# Patient Record
Sex: MALE | Race: WHITE
[De-identification: names, ages, dates, MRNs, and addresses within clinical notes are randomized per-mention and may not be internally consistent; named-entity substitution may affect disease eponyms.]

---

## 2019-02-12 ENCOUNTER — HOSPITAL ENCOUNTER (INPATIENT)
Dept: HOSPITAL 31 - C.ER | Age: 59
LOS: 2 days | Discharge: HOME | DRG: 225 | End: 2019-02-14
Attending: FAMILY MEDICINE | Admitting: FAMILY MEDICINE
Payer: COMMERCIAL

## 2019-02-12 DIAGNOSIS — I10: ICD-10-CM

## 2019-02-12 DIAGNOSIS — K35.80: Primary | ICD-10-CM

## 2019-02-12 DIAGNOSIS — E11.9: ICD-10-CM

## 2019-02-12 DIAGNOSIS — K42.9: ICD-10-CM

## 2019-02-12 LAB
ALBUMIN SERPL-MCNC: 4.4 G/DL (ref 3.5–5)
ALBUMIN/GLOB SERPL: 1.4 {RATIO} (ref 1–2.1)
ALT SERPL-CCNC: 17 U/L (ref 21–72)
AST SERPL-CCNC: 25 U/L (ref 17–59)
BASOPHILS # BLD AUTO: 0.1 K/UL (ref 0–0.2)
BASOPHILS NFR BLD: 0.7 % (ref 0–2)
BILIRUB UR-MCNC: NEGATIVE MG/DL
BUN SERPL-MCNC: 22 MG/DL (ref 9–20)
CALCIUM SERPL-MCNC: 8.8 MG/DL (ref 8.6–10.4)
EOSINOPHIL # BLD AUTO: 0.2 K/UL (ref 0–0.7)
EOSINOPHIL NFR BLD: 2.3 % (ref 0–4)
ERYTHROCYTE [DISTWIDTH] IN BLOOD BY AUTOMATED COUNT: 13.5 % (ref 11.5–14.5)
GFR NON-AFRICAN AMERICAN: > 60
GLUCOSE UR STRIP-MCNC: (no result) MG/DL
HGB BLD-MCNC: 14.8 G/DL (ref 12–18)
LEUKOCYTE ESTERASE UR-ACNC: (no result) LEU/UL
LYMPHOCYTES # BLD AUTO: 2.7 K/UL (ref 1–4.3)
LYMPHOCYTES NFR BLD AUTO: 33.2 % (ref 20–40)
MCH RBC QN AUTO: 29.5 PG (ref 27–31)
MCHC RBC AUTO-ENTMCNC: 33.5 G/DL (ref 33–37)
MCV RBC AUTO: 88.1 FL (ref 80–94)
MONOCYTES # BLD: 0.8 K/UL (ref 0–0.8)
MONOCYTES NFR BLD: 9.7 % (ref 0–10)
NEUTROPHILS # BLD: 4.3 K/UL (ref 1.8–7)
NEUTROPHILS NFR BLD AUTO: 54.1 % (ref 50–75)
NRBC BLD AUTO-RTO: 0.1 % (ref 0–2)
PH UR STRIP: 6 [PH] (ref 5–8)
PLATELET # BLD: 231 K/UL (ref 130–400)
PMV BLD AUTO: 8.4 FL (ref 7.2–11.7)
PROT UR STRIP-MCNC: NEGATIVE MG/DL
RBC # BLD AUTO: 5.02 MIL/UL (ref 4.4–5.9)
RBC # UR STRIP: (no result) /UL
SP GR UR STRIP: 1.01 (ref 1–1.03)
UROBILINOGEN UR-MCNC: NORMAL MG/DL (ref 0.2–1)
WBC # BLD AUTO: 8 K/UL (ref 4.8–10.8)

## 2019-02-12 NOTE — CP.PCM.HP
<Anitha Burt - Last Filed: 19 00:30>





History of Present Illness





- History of Present Illness


History of Present Illness: 





PGY-1 Anitha Burt D.O. H&P for Dr. Knowles's service:





Patient is a 59 yo [place] male with a history of T2DM and HTN who presents 

with abdominal pain. Patient states the pain started 2-3 days ago. It came on 

gradually and was located in his mid right abdomen. The pain worsened and 

migrated more into his RLQ. He describes the pain as sharp. He rates the pain 

severity as 7/10. He did not do anything/take any medication to help relieve the

pain. He did not note anything that makes the pain worse. He denies fevers and 

chills. Denies N/V/C/D. Denies change in appetite.





PMH: T2DM, HTN


PSH: back surgery for herniated disc 30 years ago


Meds: Metformin 500 mg PO BID, Lisinopril 10 mg PO daily


All: NKA upon admission (reaction to Zosyn in ED)


FH: father ( 84)- cancer in the abdomen


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 

tobacco, illicit drug use


PMD: none (previously Kath Iqbal)








Present on Admission





- Present on Admission


Any Indicators Present on Admission: No


History of DVT/PE: No


History of Uncontrolled Diabetes: No


Urinary Catheter: No


Decubitus Ulcer Present: No


History Surgical Site Infection Following: None





Review of Systems





- Constitutional


Constitutional: absent: Chills, Fever, Weight Loss





- EENT


Eyes: absent: Change in Vision


Ears: absent: Decreased Hearing


Nose/Mouth/Throat: absent: Nasal Congestion





- Cardiovascular


Cardiovascular: absent: Chest Pain, Diaphoresis, Dyspnea, Palpitations, Syncope





- Respiratory


Respiratory: absent: Cough, Dyspnea





- Gastrointestinal


Gastrointestinal: As Per HPI, Abdominal Pain.  absent: Constipation, Diarrhea, 

Heartburn, Nausea, Vomiting





- Genitourinary


Genitourinary: Dysuria, Urinary Frequency.  absent: Hematuria





- Musculoskeletal


Musculoskeletal: absent: Numbness, Tingling





- Integumentary


Integumentary: Pruritus.  absent: Lesions





- Neurological


Neurological: absent: Dizziness, Focal Weakness, Headaches, Paresthesias





- Psychiatric


Psychiatric: absent: Anxiety, Depression





- Endocrine


Endocrine: Polyuria.  absent: Palpitations





- Hematologic/Lymphatic


Hematologic: absent: Easy Bleeding, Easy Bruising, Lymphadenopathy





Past Patient History





- Infectious Disease


Hx of Infectious Diseases: None





- Tetanus Immunizations


Tetanus Immunization: Unknown





- Past Medical History & Family History


Past Medical History?: Yes


Past Family History: Reviewed and not pertinent





- Past Social History


Smoking Status: Never Smoked


Chewing Tobacco Use: No


Cigar Use: No


Alcohol: None


Drugs: Denies


Home Situation {Lives}: With Family





- CARDIAC


Hx Hypertension: Yes





- ENDOCRINE/METABOLIC


Hx Diabetes Mellitus Type 2: Yes





- PSYCHIATRIC


Hx Substance Use: No





Meds


Allergies/Adverse Reactions: 


                                    Allergies











Allergy/AdvReac Type Severity Reaction Status Date / Time


 


piperacillin [From Zosyn] Allergy  RASH Verified 19 00:18


 


tazobactam [From Zosyn] Allergy  RASH Verified 19 00:18














Physical Exam





- Constitutional


Appears: Non-toxic, No Acute Distress





- Head Exam


Head Exam: ATRAUMATIC, NORMAL INSPECTION





- Eye Exam


Eye Exam: EOMI, Normal appearance, PERRL





- ENT Exam


ENT Exam: Mucous Membranes Moist





- Neck Exam


Neck exam: Positive for: Normal Inspection





- Respiratory Exam


Respiratory Exam: Clear to Auscultation Bilateral, NORMAL BREATHING PATTERN





- Cardiovascular Exam


Cardiovascular Exam: RRR, +S1, +S2





- GI/Abdominal Exam


GI & Abdominal Exam: Guarding, Normal Bowel Sounds, Soft, Tenderness (RLQ).  

absent: Distended, Rebound, Rigid


Additional comments: 





negative Almeida's sign


negative Psoas sign








- Extremities Exam


Extremities exam: Positive for: normal inspection, pedal pulses present.  

Negative for: pedal edema





- Back Exam


Back exam: NORMAL INSPECTION





- Neurological Exam


Neurological exam: Alert, CN II-XII Intact, Oriented x3





- Psychiatric Exam


Psychiatric exam: Normal Affect, Normal Mood





- Skin


Skin Exam: Normal Color, Rash (hives on shoulders and left arm), Warm





Results





- Vital Signs


Recent Vital Signs: 





                                Last Vital Signs











Temp  98.5 F   19 19:15


 


Pulse  88   19 21:57


 


Resp  16   19 21:57


 


BP  147/91 H  19 21:57


 


Pulse Ox  98   19 21:57














- Labs


Result Diagrams: 


                                 19 19:46





                                 19 19:46


Labs: 





                         Laboratory Results - last 24 hr











  19/19





  19:46 19:46 19:48


 


WBC  8.0  


 


RBC  5.02  


 


Hgb  14.8  


 


Hct  44.2  


 


MCV  88.1  


 


MCH  29.5  


 


MCHC  33.5  


 


RDW  13.5  


 


Plt Count  231  


 


MPV  8.4  


 


Neut % (Auto)  54.1  


 


Lymph % (Auto)  33.2  


 


Mono % (Auto)  9.7  


 


Eos % (Auto)  2.3  


 


Baso % (Auto)  0.7  


 


Neut # (Auto)  4.3  


 


Lymph # (Auto)  2.7  


 


Mono # (Auto)  0.8  


 


Eos # (Auto)  0.2  


 


Baso # (Auto)  0.1  


 


Sodium   135 


 


Potassium   3.9 


 


Chloride   99 


 


Carbon Dioxide   26 


 


Anion Gap   13 


 


BUN   22 H 


 


Creatinine   0.9 


 


Est GFR ( Amer)   > 60 


 


Est GFR (Non-Af Amer)   > 60 


 


POC Glucose (mg/dL)    204 H


 


Random Glucose   233 H 


 


Calcium   8.8 


 


Total Bilirubin   0.7 


 


AST   25 


 


ALT   17 L 


 


Alkaline Phosphatase   55 


 


Total Protein   7.6 


 


Albumin   4.4 


 


Globulin   3.2 


 


Albumin/Globulin Ratio   1.4 


 


Urine Color   


 


Urine Clarity   


 


Urine pH   


 


Ur Specific Gravity   


 


Urine Protein   


 


Urine Glucose (UA)   


 


Urine Ketones   


 


Urine Blood   


 


Urine Nitrate   


 


Urine Bilirubin   


 


Urine Urobilinogen   


 


Ur Leukocyte Esterase   


 


Urine RBC (Auto)   














  19





  20:36


 


WBC 


 


RBC 


 


Hgb 


 


Hct 


 


MCV 


 


MCH 


 


MCHC 


 


RDW 


 


Plt Count 


 


MPV 


 


Neut % (Auto) 


 


Lymph % (Auto) 


 


Mono % (Auto) 


 


Eos % (Auto) 


 


Baso % (Auto) 


 


Neut # (Auto) 


 


Lymph # (Auto) 


 


Mono # (Auto) 


 


Eos # (Auto) 


 


Baso # (Auto) 


 


Sodium 


 


Potassium 


 


Chloride 


 


Carbon Dioxide 


 


Anion Gap 


 


BUN 


 


Creatinine 


 


Est GFR ( Amer) 


 


Est GFR (Non-Af Amer) 


 


POC Glucose (mg/dL) 


 


Random Glucose 


 


Calcium 


 


Total Bilirubin 


 


AST 


 


ALT 


 


Alkaline Phosphatase 


 


Total Protein 


 


Albumin 


 


Globulin 


 


Albumin/Globulin Ratio 


 


Urine Color  Yellow


 


Urine Clarity  Clear


 


Urine pH  6.0


 


Ur Specific Gravity  1.010


 


Urine Protein  Negative


 


Urine Glucose (UA)  1+ H


 


Urine Ketones  Negative


 


Urine Blood  2+ H


 


Urine Nitrate  Negative


 


Urine Bilirubin  Negative


 


Urine Urobilinogen  Normal


 


Ur Leukocyte Esterase  Neg


 


Urine RBC (Auto)  15 H














Assessment & Plan





- Assessment and Plan (Free Text)


Assessment: 





Patient is a 59 yo male with a history of T2DM and HTN who presented with 

abdominal pain. CT showed acute appendicitis. Surgery consulted and patient will

go to OR tomorrow .





Plan: 





Acute appendicitis


- CT A/P: fluid distention up to 1.2 cm transversely of retrocecal appendix, 

appendiceal mucosal wall thickening with associated periappendiceal haziness 

consistent with acute appendicitis


- Pre-op EKG, CXR, coags


- Afebrile, no leukocytosis


- f/u Blood Cx


- Patient received Zosyn in ED- developed hives and pruritus, Zosyn stopped, 

given IV Benadryl


- Cipro 400 mg IV Q12H- started 


- NS @ 100 mL/hr


- Surgery consulted (Nell)





Microscopic hematuria


- CT A/P: kidneys wnl, no urinary calculi, bladder normal size and configuration


- UA: 1+ glucose, 2+ blood, 15 RBC


- f/u Urine Cx





Type 2 diabetes mellitus


- Accuchecks Q6H


- Hypoglycemic protocol


- ISS


- A1c pending





Hypertension


- Vitals Q6H


- Lisinopril 10 mg PO daily





Ppx:


VTE: SCDs, chemical AC contraindicated for surgery


GI: Protonix 40 mg IV daily





Code status: full code





Case discussed with attending, Dr. Knowles.








<Isaak Knowles - Last Filed: 19 06:15>





Results





- Vital Signs


Recent Vital Signs: 





                                Last Vital Signs











Temp  98 F   19 01:00


 


Pulse  82   19 01:00


 


Resp  20   19 01:00


 


BP  151/90 H  19 01:00


 


Pulse Ox  97   19 01:00














- Labs


Result Diagrams: 


                                 19 19:46





                                 19 19:46


Labs: 





                         Laboratory Results - last 24 hr











  19





  19:46 19:46 19:48


 


WBC  8.0  


 


RBC  5.02  


 


Hgb  14.8  


 


Hct  44.2  


 


MCV  88.1  


 


MCH  29.5  


 


MCHC  33.5  


 


RDW  13.5  


 


Plt Count  231  


 


MPV  8.4  


 


Neut % (Auto)  54.1  


 


Lymph % (Auto)  33.2  


 


Mono % (Auto)  9.7  


 


Eos % (Auto)  2.3  


 


Baso % (Auto)  0.7  


 


Neut # (Auto)  4.3  


 


Lymph # (Auto)  2.7  


 


Mono # (Auto)  0.8  


 


Eos # (Auto)  0.2  


 


Baso # (Auto)  0.1  


 


PT   


 


INR   


 


APTT   


 


Sodium   135 


 


Potassium   3.9 


 


Chloride   99 


 


Carbon Dioxide   26 


 


Anion Gap   13 


 


BUN   22 H 


 


Creatinine   0.9 


 


Est GFR ( Amer)   > 60 


 


Est GFR (Non-Af Amer)   > 60 


 


POC Glucose (mg/dL)    204 H


 


Random Glucose   233 H 


 


Calcium   8.8 


 


Total Bilirubin   0.7 


 


AST   25 


 


ALT   17 L 


 


Alkaline Phosphatase   55 


 


Total Protein   7.6 


 


Albumin   4.4 


 


Globulin   3.2 


 


Albumin/Globulin Ratio   1.4 


 


Urine Color   


 


Urine Clarity   


 


Urine pH   


 


Ur Specific Gravity   


 


Urine Protein   


 


Urine Glucose (UA)   


 


Urine Ketones   


 


Urine Blood   


 


Urine Nitrate   


 


Urine Bilirubin   


 


Urine Urobilinogen   


 


Ur Leukocyte Esterase   


 


Urine RBC (Auto)   














  19





  20:36 00:35 00:46


 


WBC   


 


RBC   


 


Hgb   


 


Hct   


 


MCV   


 


MCH   


 


MCHC   


 


RDW   


 


Plt Count   


 


MPV   


 


Neut % (Auto)   


 


Lymph % (Auto)   


 


Mono % (Auto)   


 


Eos % (Auto)   


 


Baso % (Auto)   


 


Neut # (Auto)   


 


Lymph # (Auto)   


 


Mono # (Auto)   


 


Eos # (Auto)   


 


Baso # (Auto)   


 


PT    12.7 H


 


INR    1.2


 


APTT    31


 


Sodium   


 


Potassium   


 


Chloride   


 


Carbon Dioxide   


 


Anion Gap   


 


BUN   


 


Creatinine   


 


Est GFR ( Amer)   


 


Est GFR (Non-Af Amer)   


 


POC Glucose (mg/dL)   131 H 


 


Random Glucose   


 


Calcium   


 


Total Bilirubin   


 


AST   


 


ALT   


 


Alkaline Phosphatase   


 


Total Protein   


 


Albumin   


 


Globulin   


 


Albumin/Globulin Ratio   


 


Urine Color  Yellow  


 


Urine Clarity  Clear  


 


Urine pH  6.0  


 


Ur Specific Gravity  1.010  


 


Urine Protein  Negative  


 


Urine Glucose (UA)  1+ H  


 


Urine Ketones  Negative  


 


Urine Blood  2+ H  


 


Urine Nitrate  Negative  


 


Urine Bilirubin  Negative  


 


Urine Urobilinogen  Normal  


 


Ur Leukocyte Esterase  Neg  


 


Urine RBC (Auto)  15 H  














Assessment & Plan





- Date & Time


Date: 19 (I have seen and examined the patient.  I agree with the findings

and plan of care as documented by Dr. Burt.  Patient with acute appendicitis.

  Consult to surgery.  Medically optimize.  Plan for appendectomy.  Zosyn given 

in ED but patient had allergic reaction.  Airway not compromised.  Changed to 

cipro.  History of hypertension and diabetes.  Continue home meds.  Monitor for 

acute changes.)


Time: 06:13





Attending/Attestation





- Attestation


I have personally seen and examined this patient.: Yes


I have fully participated in the care of the patient.: Yes


I have reviewed all pertinent clinical information: Yes

## 2019-02-12 NOTE — C.PDOC
History Of Present Illness


58 year old male presents to ED with complaint of right lower quadrant abdominal

pain that began 2 days ago. He describes the pain as  constant and worsening 

with no radiation. He has a past medical history of hypertension and diabetes. 

Patient's PMD is . He denies smoking and any alcohol or drug abuse. 

Patient has a family history of stomach cancer. Patient has been experiencing 

discomfort with urination and frequency of urination. He denies any testicular 

or flank pain, fever, chills, nausea, vomiting, diarrhea, and constipation.





Time Seen by Provider: 02/12/19 19:31


Chief Complaint (Nursing): Abdominal Pain


History Per: Patient


History/Exam Limitations: no limitations


Onset/Duration Of Symptoms: Days (2)


Current Symptoms Are (Timing): Still Present


Location Of Pain/Discomfort: RLQ


Quality Of Discomfort: "Pain"


Associated Symptoms: Urinary Symptoms.  denies: Fever, Chills, Nausea, Vomiting,

Diarrhea, Constipation, Other (testicular pain)





Past Medical History


Reviewed: Historical Data, Nursing Documentation, Vital Signs


Vital Signs: 





                                Last Vital Signs











Temp  98.5 F   02/12/19 19:15


 


Pulse  90   02/12/19 19:15


 


Resp  16   02/12/19 19:15


 


BP  144/94 H  02/12/19 19:15


 


Pulse Ox  99   02/12/19 19:15














- Medical History


PMH: Diabetes, HTN


Family History: States: Other


Other Family History: Stomach cancer





- Social History


Hx Alcohol Use: No


Hx Substance Use: No





- Immunization History


Hx Tetanus Toxoid Vaccination: No


Hx Influenza Vaccination: No


Hx Pneumococcal Vaccination: No





Review Of Systems


Constitutional: Negative for: Fever, Chills, Weakness


Cardiovascular: Negative for: Chest Pain, Palpitations


Respiratory: Negative for: Cough, Shortness of Breath


Gastrointestinal: Positive for: Abdominal Pain (right lower quadrant).  Negative

for: Nausea, Vomiting, Diarrhea, Constipation


Genitourinary: Positive for: Dysuria (discomfort), Frequency.  Negative for: 

Penile Pain


Neurological: Negative for: Weakness, Numbness, Dizziness





Physical Exam





- Physical Exam


Appears: No Acute Distress


Skin: Warm, Dry


Head: Atraumatic, Normacephalic


Eye(s): bilateral: PERRL, EOMI


Oral Mucosa: Moist


Throat: No Erythema, No Exudate


Cardiovascular: Rhythm Regular, No Murmur


Respiratory: Normal Breath Sounds, No Wheezing


Gastrointestinal/Abdominal: Tenderness (right lower quadrant, McBurney's point),

No Mass, No Guarding, No Rebound, No Other (Almeida's sign)


Back: Normal Inspection


Extremity: Normal ROM, No Deformity





ED Course And Treatment





- Laboratory Results


Result Diagrams: 


                                 02/14/19 07:18





                                 02/14/19 07:18


O2 Sat by Pulse Oximetry: 99 (RA)





Medical Decision Making


Medical Decision Making: 


Impression: Right lower quadrant pain 





Differential Diagnosis: appendicitis, mesenteric addenitis, cystitis, renal 

colic





Plan: 


CT Abdomen and Pelvis ordered for patient.


Labs ordered with glucose and UA for patient.


Patient given IV fluids.








Labs unremarkable but CT c/w appendicitis


DW Dr Camejo Surgery and Dr Knowles Hospitalist


DW pt findings and plan of care. Pt continues to decline pain medication.











Disposition


Counseled Patient/Family Regarding: Studies Performed, Diagnosis





- Disposition


Disposition: HOSPITALIZED


Disposition Time: 23:00


Condition: FAIR





- Clinical Impression


Clinical Impression: 


 Appendicitis








- Scribe Statement


The provider has reviewed the documentation as recorded by the Scribe (Treasure Newman)








All medical record entries made by the Scribe were at my direction and 

personally dictated by me. I have reviewed the chart and agree that the record 

accurately reflects my personal performance of the history, physical exam, 

medical decision making, and the department course for this patient. I have also

personally directed, reviewed, and agree with the discharge instructions and 

disposition.

## 2019-02-13 VITALS — RESPIRATION RATE: 20 BRPM

## 2019-02-13 LAB
ALBUMIN SERPL-MCNC: 4.1 G/DL (ref 3.5–5)
ALBUMIN/GLOB SERPL: 1.3 {RATIO} (ref 1–2.1)
ALT SERPL-CCNC: 23 U/L (ref 21–72)
APTT BLD: 31 SECONDS (ref 21–34)
AST SERPL-CCNC: 19 U/L (ref 17–59)
BASOPHILS # BLD AUTO: 0 K/UL (ref 0–0.2)
BASOPHILS NFR BLD: 0.5 % (ref 0–2)
BUN SERPL-MCNC: 15 MG/DL (ref 9–20)
CALCIUM SERPL-MCNC: 8.4 MG/DL (ref 8.6–10.4)
EOSINOPHIL # BLD AUTO: 0.2 K/UL (ref 0–0.7)
EOSINOPHIL NFR BLD: 3.1 % (ref 0–4)
ERYTHROCYTE [DISTWIDTH] IN BLOOD BY AUTOMATED COUNT: 13.7 % (ref 11.5–14.5)
GFR NON-AFRICAN AMERICAN: > 60
HGB BLD-MCNC: 14.7 G/DL (ref 12–18)
INR PPP: 1.2
LYMPHOCYTES # BLD AUTO: 1.9 K/UL (ref 1–4.3)
LYMPHOCYTES NFR BLD AUTO: 28.4 % (ref 20–40)
MCH RBC QN AUTO: 30.3 PG (ref 27–31)
MCHC RBC AUTO-ENTMCNC: 34 G/DL (ref 33–37)
MCV RBC AUTO: 89 FL (ref 80–94)
MONOCYTES # BLD: 0.7 K/UL (ref 0–0.8)
MONOCYTES NFR BLD: 10 % (ref 0–10)
NEUTROPHILS # BLD: 4 K/UL (ref 1.8–7)
NEUTROPHILS NFR BLD AUTO: 58 % (ref 50–75)
NRBC BLD AUTO-RTO: 0 % (ref 0–2)
PLATELET # BLD: 218 K/UL (ref 130–400)
PMV BLD AUTO: 8.3 FL (ref 7.2–11.7)
PROTHROMBIN TIME: 12.7 SECONDS (ref 9.7–12.2)
RBC # BLD AUTO: 4.84 MIL/UL (ref 4.4–5.9)
WBC # BLD AUTO: 6.9 K/UL (ref 4.8–10.8)

## 2019-02-13 PROCEDURE — 0DTJ4ZZ RESECTION OF APPENDIX, PERCUTANEOUS ENDOSCOPIC APPROACH: ICD-10-PCS

## 2019-02-13 RX ADMIN — CIPROFLOXACIN SCH MLS/HR: 2 INJECTION, SOLUTION INTRAVENOUS at 01:44

## 2019-02-13 RX ADMIN — HUMAN INSULIN SCH: 100 INJECTION, SOLUTION SUBCUTANEOUS at 18:00

## 2019-02-13 RX ADMIN — HUMAN INSULIN SCH: 100 INJECTION, SOLUTION SUBCUTANEOUS at 12:14

## 2019-02-13 RX ADMIN — WATER SCH MLS/HR: 1 INJECTION INTRAMUSCULAR; INTRAVENOUS; SUBCUTANEOUS at 17:07

## 2019-02-13 RX ADMIN — CIPROFLOXACIN SCH MLS/HR: 2 INJECTION, SOLUTION INTRAVENOUS at 12:42

## 2019-02-13 RX ADMIN — WATER SCH MLS: 1 INJECTION INTRAMUSCULAR; INTRAVENOUS; SUBCUTANEOUS at 10:25

## 2019-02-13 RX ADMIN — HUMAN INSULIN SCH: 100 INJECTION, SOLUTION SUBCUTANEOUS at 00:36

## 2019-02-13 RX ADMIN — HUMAN INSULIN SCH: 100 INJECTION, SOLUTION SUBCUTANEOUS at 06:57

## 2019-02-13 NOTE — CP.PCM.PN
Subjective





- Date & Time of Evaluation


Date of Evaluation: 02/13/19


Time of Evaluation: 07:30





- Subjective


Subjective: 





PGY1 Medicine progress note for Dr. Noriega





Pt was seen and examined at bedside. Pt is resting comfortably. He rates his RLQ

abdominal pain as 2/10, improved from 8/10. Pt denies radiation of the pain. He 

denies fever, chills, chest pain, sob, n/v/d, dysuria, hematuria. Appendectomy 

is to be done later this morning.





Objective





- Vital Signs/Intake and Output


Vital Signs (last 24 hours): 


                                        











Temp Pulse Resp BP Pulse Ox


 


 98 F   82   20   151/90 H  97 


 


 02/13/19 01:00  02/13/19 01:00  02/13/19 01:00  02/13/19 01:00  02/13/19 01:00











- Medications


Medications: 


                               Current Medications





Dextrose (Dextrose 50% Inj)  0 ml IV STAT PRN; Protocol


   PRN Reason: Hypoglycemia Protocol


Dextrose (Glutose 15)  0 gm PO ONCE PRN; Protocol


   PRN Reason: Hypoglycemia Protocol


Glucagon (Glucagen Diagnostic Kit)  0 mg IM STAT PRN; Protocol


   PRN Reason: Hypoglycemia Protocol


Ciprofloxacin (Cipro 400mg/200ml Dsw)  400 mg in 200 mls @ 133 mls/hr IVPB Q12H 

TRINH; Protocol


   Last Admin: 02/13/19 01:44 Dose:  133 mls/hr


Dextrose (Dextrose 5% In Water 1000 Ml)  1,000 mls @ 0 mls/hr IV .Q0M PRN; 

Protocol


   PRN Reason: Hypoglycemia Protocol


Sodium Chloride (Sodium Chloride 0.9%)  1,000 mls @ 100 mls/hr IV .Q10H TRINH


   Last Admin: 02/13/19 01:15 Dose:  100 mls/hr


Insulin Human Regular (Novolin R)  0 unit SC Q6H TRINH; Protocol


   Last Admin: 02/13/19 00:36 Dose:  Not Given


Lisinopril (Zestril)  10 mg PO DAILY TRINH


Pantoprazole Sodium (Protonix Inj)  40 mg IVP DAILY TRINH


Pneumococcal Polyvalent Vaccine (Pneumovax 23 Vaccine)  0.5 ml IM .ONCE ONE


   Stop: 02/14/19 10:01











- Labs


Labs: 


                                        





                                 02/12/19 19:46 





                                 02/12/19 19:46 





                                        











PT  12.7 SECONDS (9.7-12.2)  H  02/13/19  00:46    


 


INR  1.2   02/13/19  00:46    


 


APTT  31 SECONDS (21-34)   02/13/19  00:46    














- Additional Findings


Additional findings: 





- Constitutional


Appears: Non-toxic, No Acute Distress





- Head Exam


Head Exam: ATRAUMATIC, NORMAL INSPECTION





- Eye Exam


Eye Exam: EOMI, Normal appearance, PERRL





- ENT Exam


ENT Exam: Mucous Membranes Moist





- Neck Exam


Neck exam: Positive for: Normal Inspection





- Respiratory Exam


Respiratory Exam: Clear to Auscultation Bilateral, NORMAL BREATHING PATTERN





- Cardiovascular Exam


Cardiovascular Exam: RRR, +S1, +S2





- GI/Abdominal Exam


GI & Abdominal Exam: Normal Bowel Sounds, Soft, Mild Tenderness at mcburney's 

point (RLQ).  absent: Distended, Rebound, Rigid


Additional comments: 





negative Almeida's sign


negative Psoas sign








- Extremities Exam


Extremities exam: Positive for: normal inspection, pedal pulses present.  

Negative for: pedal edema





- Back Exam


Back exam: NORMAL INSPECTION





- Neurological Exam


Neurological exam: Alert, CN II-XII Intact, Oriented x3





- Psychiatric Exam


Psychiatric exam: Normal Affect, Normal Mood





- Skin


Skin Exam: Normal Color, Warm. No rash.





Assessment and Plan





- Assessment and Plan (Free Text)


Assessment: 





Patient is a 59 yo male with a history of T2DM and HTN who presented with 

abdominal pain. CT showed acute appendicitis. Surgery consulted and pt to go for

appendectomy today.





Plan: 





Acute appendicitis


CT A/P: fluid distention up to 1.2 cm transversely of retrocecal appendix, 

appendiceal mucosal wall thickening with associated periappendiceal haziness 

consistent with acute appendicitis


Afebrile, no leukocytosis


Patient received Zosyn in ED- developed hives and pruritus, Zosyn stopped, given

IV Benadryl. 


   Pt states his rash has improved and denies any difficulty breathing or 

recurrence of itchiness or rash


Cipro 400 mg IV q12h started 2/13, Flagyl 500 mg IVPB q8h started 2/13


Continue NS @ 100 mL/hr


Surgery consulted (Nell). Will take to OR this morning.


F/u blood culture





Microscopic hematuria


CT A/P: kidneys wnl, no urinary calculi, bladder normal size and configuration


UA: 1+ glucose, 2+ blood, 15 RBC


Urine culture is negative


Will continue to monitor





NIDDM2


On metformin at home; which has been held


HgbA1c is 7.7


Accuchecks ACHS


Hypoglycemic protocol


RISS ACHS





Hx of HTN


Blood pressure is well controlled with home Lisinopril 10 mg PO daily





Pulmonary nodules


CT A/P shows multiple pulmonary nodules. Few paraortic and inguinal lymph nodes


CXR shows no evidence of acute pulmonary disease. History noted nodule in the 

RML in the prior study is not clearly seen in the current study. If indicated, 6

months follow up reassessment by CT of the chest is recommended.


Will refer for Chest CT on discharge and follow up with PMD or clinic





Ppx:


VTE: SCDs, chemical AC contraindicated for surgery


GI: Protonix 40 mg IV daily





Code status: full code





Dispo: will monitor s/p appendectomy, potential discharge in tomorrow if no 

complications





Case discussed with Dr. Leann Garcia PGY1

## 2019-02-13 NOTE — CT
CT abdomen and pelvis 



HISTORY:

Right lower quadrant abdominal pain. 



COMPARISON:

None available. 



TECHNIQUE:

Multiple contiguous axial images were performed through the abdomen 

and pelvis with the use of intravenous contrast.  Subsequently, 

sagittal and coronal reformatted images were obtained. 



This CT exam was performed using one or more of the following dose 

reduction techniques: Automated exposure control, adjustment of the 

mA and/or kV according to patient size, and/or use of iterative 

reconstruction technique.



Findings: 



6.4 millimeter pulmonary nodule within the right middle lobe on 

series 3, image 7.  Scattered atelectasis within the remaining 

visualized lung fields.  2-3 millimeter nodular density at the left 

lung base on series 3, image 20.  Additional 4 millimeter pulmonary 

nodule at the  left lung base on series 3, image 6.  



No pleural or pericardial effusion.  



Hepatomegaly. 



Suggestion of a radiodense foci at the neck of the gallbladder which 

may represent a calculus.  Correlation with right upper quadrant 

abdominal ultrasound may be helpful if clinically indicated.



Spleen is preserved.



Adrenal glands are preserved.



Mild fatty atrophy of the pancreas.



Upper abdominal bowel is grossly preserved.



Right kidney: Mild perinephric fat stranding.  Mild fullness of the 

right renal collecting system and ureter.



Left Kidney: Mild perinephric fat stranding.  No gross calculi or 

hydronephrosis.



Urinary bladder is preserved.



Heterogeneous and prominent prostate.



Fecal retention in the colon.



Thickened and enhancing appendix measuring up to 1.3 centimeters with 

adjacent fluid and fat stranding concerning for acute appendicitis.



Atherosclerotic calcification and plaque within the aorta.



Few shotty para-aortic and inguinal lymph nodes.



Few shotty mesenteric lymph nodes.



Degenerative changes in the spine and hips.   Suggestion of 6 lumbar 

type vertebral bodies with prominent disc space narrowing and 

posterior disc osteophyte complex at the L5-6 level.  Clinical 

correlation. 



Impression: 



1. Findings concerning for acute appendicitis.  Clinical correlation. 



2. Prostatic hypertrophy. 



3. Cholelithiasis. 



4. Scattered pulmonary nodules in the lung fields with a prominent 

6.4 millimeter pulmonary nodule within the right middle lobe.  Three 

-  6 month interval follow-up chest CT scan would be helpful for 

further evaluation if clinically indicated. 



5.  Hepatomegaly.  



6. Degenerative changes in the spine. 



A preliminary report was generated at 11:02 p.m. on 02/12/2019 by Dr. King Banuelos from [facility].

## 2019-02-13 NOTE — RAD
Date of service: 



02/13/2019



HISTORY:

 SOB 



COMPARISON:

Comparison is made with the CT of the abdomen dated 2/12/2019 which 

showed right middle lobe nodule



TECHNIQUE:

Chest PA and lateral



FINDINGS:



LUNGS:

Previously noted right middle lobe nodule in the previous CT of the 

abdomen is not clearly seen in the current exam.



PLEURA:

No significant pleural effusion identified. No pneumothorax apparent.



CARDIOVASCULAR:

No aortic atherosclerotic calcification present.



Normal cardiac size. No pulmonary vascular congestion. 



OSSEOUS STRUCTURES:

No significant abnormalities.



VISUALIZED UPPER ABDOMEN:

Normal.



OTHER FINDINGS:

None.



IMPRESSION:

No evidence of acute pulmonary disease.  History noted nodule in the 

right middle lobe in the prior study is not clearly seen in the 

current study.  If indicated 6 months follow-up reassessment by CT of 

the chest is recommended.

## 2019-02-14 VITALS
HEART RATE: 81 BPM | OXYGEN SATURATION: 99 % | DIASTOLIC BLOOD PRESSURE: 87 MMHG | TEMPERATURE: 97.5 F | SYSTOLIC BLOOD PRESSURE: 145 MMHG

## 2019-02-14 LAB
ALBUMIN SERPL-MCNC: 4.1 G/DL (ref 3.5–5)
ALBUMIN/GLOB SERPL: 1.3 {RATIO} (ref 1–2.1)
ALT SERPL-CCNC: 23 U/L (ref 21–72)
AST SERPL-CCNC: 24 U/L (ref 17–59)
BASOPHILS # BLD AUTO: 0 K/UL (ref 0–0.2)
BASOPHILS NFR BLD: 0.4 % (ref 0–2)
BUN SERPL-MCNC: 12 MG/DL (ref 9–20)
CALCIUM SERPL-MCNC: 8.3 MG/DL (ref 8.6–10.4)
EOSINOPHIL # BLD AUTO: 0.2 K/UL (ref 0–0.7)
EOSINOPHIL NFR BLD: 2.3 % (ref 0–4)
ERYTHROCYTE [DISTWIDTH] IN BLOOD BY AUTOMATED COUNT: 13.7 % (ref 11.5–14.5)
GFR NON-AFRICAN AMERICAN: > 60
HGB BLD-MCNC: 14.3 G/DL (ref 12–18)
LYMPHOCYTES # BLD AUTO: 2.9 K/UL (ref 1–4.3)
LYMPHOCYTES NFR BLD AUTO: 36.6 % (ref 20–40)
MCH RBC QN AUTO: 29.8 PG (ref 27–31)
MCHC RBC AUTO-ENTMCNC: 33.4 G/DL (ref 33–37)
MCV RBC AUTO: 89.3 FL (ref 80–94)
MONOCYTES # BLD: 0.9 K/UL (ref 0–0.8)
MONOCYTES NFR BLD: 11.6 % (ref 0–10)
NEUTROPHILS # BLD: 3.9 K/UL (ref 1.8–7)
NEUTROPHILS NFR BLD AUTO: 49.1 % (ref 50–75)
NRBC BLD AUTO-RTO: 0.1 % (ref 0–2)
PLATELET # BLD: 229 K/UL (ref 130–400)
PMV BLD AUTO: 8.1 FL (ref 7.2–11.7)
RBC # BLD AUTO: 4.8 MIL/UL (ref 4.4–5.9)
WBC # BLD AUTO: 8 K/UL (ref 4.8–10.8)

## 2019-02-14 RX ADMIN — HUMAN INSULIN SCH: 100 INJECTION, SOLUTION SUBCUTANEOUS at 00:36

## 2019-02-14 RX ADMIN — CIPROFLOXACIN SCH MLS/HR: 2 INJECTION, SOLUTION INTRAVENOUS at 12:09

## 2019-02-14 RX ADMIN — HUMAN INSULIN SCH: 100 INJECTION, SOLUTION SUBCUTANEOUS at 06:51

## 2019-02-14 RX ADMIN — CIPROFLOXACIN SCH MLS/HR: 2 INJECTION, SOLUTION INTRAVENOUS at 00:19

## 2019-02-14 RX ADMIN — WATER SCH MLS/HR: 1 INJECTION INTRAMUSCULAR; INTRAVENOUS; SUBCUTANEOUS at 09:50

## 2019-02-14 RX ADMIN — HUMAN INSULIN SCH: 100 INJECTION, SOLUTION SUBCUTANEOUS at 12:04

## 2019-02-14 RX ADMIN — WATER SCH MLS/HR: 1 INJECTION INTRAMUSCULAR; INTRAVENOUS; SUBCUTANEOUS at 01:49

## 2019-02-14 NOTE — CARD
--------------- APPROVED REPORT --------------





Date of service: 02/13/2019



EKG Measurement

Heart [id number]

ME 150P25

[id number]

[id number]

[id number]



<Conclusion>

Normal sinus rhythm with sinus arrhythmia

Normal ECG

## 2019-02-14 NOTE — OP
PROCEDURE DATE:  02/13/2019



PREOPERATIVE DIAGNOSIS:  Acute appendicitis.



POSTOPERATIVE DIAGNOSIS:  Acute appendicitis.



PROCEDURE PERFORMED:  Laparoscopic appendectomy.



SURGEON:  Hernan Camejo MD



ANESTHESIA:  General.



BLOOD LOSS:  20 mL.



POSTOPERATIVE CONDITION:  Stable.



INDICATIONS FOR SURGERY:  This is a 58-year-old male with a two-day history

of abdominal pain, admitted last night, found to have appendicitis on CAT

scan, taken to the operating room today for laparoscopic cholecystectomy.



GROSS FINDINGS:  The patient was taken to the operating room.  General

anesthesia was administered.  The abdomen was prepped and draped.  A

periumbilical cut down was performed.  A small umbilical hernia was

encountered.  The sac was dissected free, and a blunt port was inserted

through the umbilical hernia site.  The remaining left lower quadrant and

suprapubic ports were then placed.  The appendix was identified in the

right lower quadrant.  It was inflamed and imbedded in the retroperitoneal

tissue.  Careful blunt dissection was done.  The adhesions were taken down.

At this point, there was noted to be bleeding from the appendiceal artery,

which was controlled with a clip.  TA and endoscopic ANA were then placed

across the base of the appendix and fired.  The appendix was removed

through an endoscopic bag through the lateral port.  The right lower

quadrant was irrigated with saline until clear.  Ports were removed.  The

umbilical hernia was repaired with a Vicryl, and the skin was closed with

clips.  The patient tolerated the procedure well and returned to recovery

room in stable condition.







__________________________________________

Hernan Camejo MD





DD:  02/13/2019 11:20:02

DT:  02/13/2019 20:22:14

Job # [id number]

## 2019-02-14 NOTE — CP.PCM.PN
Subjective





- Date & Time of Evaluation


Date of Evaluation: 02/14/19


Time of Evaluation: 06:00





- Subjective


Subjective: 





Progress note for Dr. Mercado covering for Dr. Camejo. 





58 year old male POD 1 s/p laparoscopic appendectomy. Patient seen and examined.

He is sitting on the side of his bed, in no acute distress. Afebrile. Tolerated 

dinner last night, and has been walking the halls. He has voided. States he 

feels well. Denies fever, chills, nausea, vomiting, chest pain and shortness of 

breath. 





Objective





- Vital Signs/Intake and Output


Vital Signs (last 24 hours): 


                                        











Temp Pulse Resp BP Pulse Ox


 


 98.0 F   70   20   114/69   98 


 


 02/14/19 04:30  02/14/19 04:30  02/14/19 04:30  02/14/19 04:30  02/14/19 04:30








Intake and Output: 


                                        











 02/14/19 02/14/19





 06:59 18:59


 


Intake Total 2340 


 


Output Total 500 


 


Balance 1840 














- Medications


Medications: 


                               Current Medications





Dextrose (Dextrose 50% Inj)  0 ml IV STAT PRN; Protocol


   PRN Reason: Hypoglycemia Protocol


Dextrose (Glutose 15)  0 gm PO ONCE PRN; Protocol


   PRN Reason: Hypoglycemia Protocol


Docusate Sodium (Colace)  100 mg PO BID TRINH


   Last Admin: 02/13/19 17:06 Dose:  100 mg


Glucagon (Glucagen Diagnostic Kit)  0 mg IM STAT PRN; Protocol


   PRN Reason: Hypoglycemia Protocol


Ciprofloxacin (Cipro 400mg/200ml Dsw)  400 mg in 200 mls @ 133 mls/hr IVPB Q12H 

TRINH; Protocol


   Last Admin: 02/14/19 00:19 Dose:  133 mls/hr


Dextrose (Dextrose 5% In Water 1000 Ml)  1,000 mls @ 0 mls/hr IV .Q0M PRN; 

Protocol


   PRN Reason: Hypoglycemia Protocol


Sodium Chloride (Sodium Chloride 0.9%)  1,000 mls @ 100 mls/hr IV .Q10H TRINH


   Last Admin: 02/14/19 06:19 Dose:  100 mls/hr


Metronidazole (Flagyl)  500 mg in 100 mls @ 100 mls/hr IVPB Q8H TRINH; Protocol


   Last Admin: 02/14/19 01:49 Dose:  100 mls/hr


Insulin Human Regular (Novolin R)  0 unit SC Q6H [facility]; Protocol


   Last Admin: 02/14/19 06:51 Dose:  Not Given


Lisinopril (Zestril)  10 mg PO DAILY [facility]


   Last Admin: 02/13/19 09:12 Dose:  10 mg


Oxycodone/Acetaminophen (Percocet 5/325 Mg Tab)  2 tab PO Q4H PRN


   PRN Reason: pain


   Stop: 02/16/19 11:05


   Last Admin: 02/14/19 00:56 Dose:  2 tab


Pantoprazole Sodium (Protonix Inj)  40 mg IVP DAILY [facility]


   Last Admin: 02/13/19 09:12 Dose:  40 mg


Pneumococcal Polyvalent Vaccine (Pneumovax 23 Vaccine)  0.5 ml IM .ONCE ONE


   Stop: 02/14/19 10:01











- Labs


Labs: 


                                        





                                 02/14/19 07:18 





                                 02/14/19 07:18 





                                        











PT  12.7 SECONDS (9.7-12.2)  H  02/13/19  00:46    


 


INR  1.2   02/13/19  00:46    


 


APTT  31 SECONDS (21-34)   02/13/19  00:46    














- Constitutional


Appears: Non-toxic, No Acute Distress





- Head Exam


Head Exam: ATRAUMATIC, NORMOCEPHALIC





- Eye Exam


Eye Exam: Normal appearance





- ENT Exam


ENT Exam: Mucous Membranes Moist





- Neck Exam


Neck Exam: Full ROM





- Respiratory Exam


Respiratory Exam: NORMAL BREATHING PATTERN.  absent: Accessory Muscle Use





- GI/Abdominal Exam


GI & Abdominal Exam: Soft.  absent: Guarding, Tenderness, Rebound


Additional comments: 


dressings over laparascopic incisions c/d/i. 





- Extremities Exam


Extremities Exam: Full ROM, Normal Inspection





- Neurological Exam


Neurological Exam: Alert, Awake, Oriented x3





- Psychiatric Exam


Psychiatric exam: Normal Affect, Normal Mood





- Skin


Skin Exam: Dry, Normal Color, Warm





Assessment and Plan





- Assessment and Plan (Free Text)


Assessment: 


58 year old male POD 1 s/p laparoscopic appendectomy.





Plan: 


-Patient afebrile, tolerating diet and ambulating


-Patient cleared for discharge from surgical standpoint. 


-Avoid heavy lifting


-Follow up in office in one week. 





Further recs as per Dr. Rogelio Mae, PGY-1

## 2019-02-14 NOTE — CP.PCM.DIS
<Surjit Garcia - Last Filed: 02/14/19 14:37>





Provider





- Provider


Date of Admission: 


02/12/19 23:23





Attending physician: 


Isaak Knowles MD





Consults: 








02/12/19 23:29


General Surgery Consult Stat 


   Comment: 


   Consulting Provider: Hernan Camejo


   Consulting Physician: Hernan Camejo


   Reason for Consult: appendicitis











Time Spent in preparation of Discharge (in minutes): 35





Diagnosis





- Discharge Diagnosis


(1) Appendicitis


Status: Acute   





(2) S/P laparoscopic appendectomy


Status: Acute   





(3) HTN (hypertension)


Status: Acute   





(4) Diabetes type 2, controlled


Status: Acute   





Hospital Course





- Lab Results


Lab Results: 


                                  Micro Results





02/13/19 00:30   Blood   Blood Culture - Preliminary


                            NO GROWTH AFTER 24 HOURS


02/13/19 00:36   Blood   Blood Culture - Preliminary


                            NO GROWTH AFTER 24 HOURS


02/12/19 20:36   Urine Random   Urine Culture - Preliminary


                            No growth.





                             Most Recent Lab Values











WBC  6.9 K/uL (4.8-10.8)   02/13/19  06:38    


 


RBC  4.84 Mil/uL (4.40-5.90)   02/13/19  06:38    


 


Hgb  14.7 g/dL (12.0-18.0)   02/13/19  06:38    


 


Hct  43.1 % (35.0-51.0)   02/13/19  06:38    


 


MCV  89.0 fL (80.0-94.0)   02/13/19  06:38    


 


MCH  30.3 pg (27.0-31.0)   02/13/19  06:38    


 


MCHC  34.0 g/dL (33.0-37.0)   02/13/19  06:38    


 


RDW  13.7 % (11.5-14.5)   02/13/19  06:38    


 


Plt Count  218 K/uL (130-400)   02/13/19  06:38    


 


MPV  8.3 fL (7.2-11.7)   02/13/19  06:38    


 


Neut % (Auto)  58.0 % (50.0-75.0)   02/13/19  06:38    


 


Lymph % (Auto)  28.4 % (20.0-40.0)   02/13/19  06:38    


 


Mono % (Auto)  10.0 % (0.0-10.0)   02/13/19  06:38    


 


Eos % (Auto)  3.1 % (0.0-4.0)   02/13/19  06:38    


 


Baso % (Auto)  0.5 % (0.0-2.0)   02/13/19  06:38    


 


Neut # (Auto)  4.0 K/uL (1.8-7.0)   02/13/19  06:38    


 


Lymph # (Auto)  1.9 K/uL (1.0-4.3)   02/13/19  06:38    


 


Mono # (Auto)  0.7 K/uL (0.0-0.8)   02/13/19  06:38    


 


Eos # (Auto)  0.2 K/uL (0.0-0.7)   02/13/19  06:38    


 


Baso # (Auto)  0.0 K/uL (0.0-0.2)   02/13/19  06:38    


 


PT  12.7 SECONDS (9.7-12.2)  H  02/13/19  00:46    


 


INR  1.2   02/13/19  00:46    


 


APTT  31 SECONDS (21-34)   02/13/19  00:46    


 


Sodium  138 mmol/L (132-148)   02/13/19  06:38    


 


Potassium  3.6 mmol/L (3.6-5.2)   02/13/19  06:38    


 


Chloride  103 mmol/L ()   02/13/19  06:38    


 


Carbon Dioxide  27 mmol/L (22-30)   02/13/19  06:38    


 


Anion Gap  11  (10-20)   02/13/19  06:38    


 


BUN  15 mg/dL (9-20)   02/13/19  06:38    


 


Creatinine  0.9 mg/dL (0.8-1.5)   02/13/19  06:38    


 


Est GFR ( Amer)  > 60   02/13/19  06:38    


 


Est GFR (Non-Af Amer)  > 60   02/13/19  06:38    


 


POC Glucose (mg/dL)  141 mg/dL ()  H  02/14/19  06:30    


 


Random Glucose  162 mg/dL ()  H D 02/13/19  06:38    


 


Hemoglobin A1c  7.7 % (4.2-6.5)  H  02/13/19  06:38    


 


Calcium  8.4 mg/dl (8.6-10.4)  L  02/13/19  06:38    


 


Phosphorus  3.3 mg/dL (2.5-4.5)   02/13/19  06:38    


 


Magnesium  1.8 mg/dL (1.6-2.3)   02/13/19  06:38    


 


Total Bilirubin  0.9 mg/dL (0.2-1.3)   02/13/19  06:38    


 


AST  19 U/L (17-59)   02/13/19  06:38    


 


ALT  23 U/L (21-72)   02/13/19  06:38    


 


Alkaline Phosphatase  61 U/L ()   02/13/19  06:38    


 


Total Protein  7.3 g/dL (6.3-8.3)   02/13/19  06:38    


 


Albumin  4.1 g/dL (3.5-5.0)   02/13/19  06:38    


 


Globulin  3.2 gm/dL (2.2-3.9)   02/13/19  06:38    


 


Albumin/Globulin Ratio  1.3  (1.0-2.1)   02/13/19  06:38    


 


Urine Color  Yellow  (YELLOW)   02/12/19  20:36    


 


Urine Clarity  Clear  (Clear)   02/12/19  20:36    


 


Urine pH  6.0  (5.0-8.0)   02/12/19  20:36    


 


Ur Specific Gravity  1.010  (1.003-1.030)   02/12/19  20:36    


 


Urine Protein  Negative mg/dL (NEGATIVE)   02/12/19  20:36    


 


Urine Glucose (UA)  1+ mg/dL (Normal)  H  02/12/19  20:36    


 


Urine Ketones  Negative mg/dL (NEGATIVE)   02/12/19  20:36    


 


Urine Blood  2+  (NEGATIVE)  H  02/12/19  20:36    


 


Urine Nitrate  Negative  (NEGATIVE)   02/12/19  20:36    


 


Urine Bilirubin  Negative  (NEGATIVE)   02/12/19  20:36    


 


Urine Urobilinogen  Normal mg/dL (0.2-1.0)   02/12/19  20:36    


 


Ur Leukocyte Esterase  Neg Merrick/uL (Negative)   02/12/19  20:36    


 


Urine RBC (Auto)  15 /hpf (0-3)  H  02/12/19  20:36    














- Hospital Course


Hospital Course: 





On admission:


Patient is a 57 yo [place] male with a history of T2DM and HTN who presents 

with abdominal pain. Patient states the pain started 2-3 days ago. It came on 

gradually and was located in his mid right abdomen. The pain worsened and 

migrated more into his RLQ. He describes the pain as sharp. He rates the pain 

severity as 7/10. He did not do anything/take any medication to help relieve the

pain. He did not note anything that makes the pain worse. He denies fevers and 

chills. Denies N/V/C/D. Denies change in appetite.





Hospital course: 


CT A/P: fluid distention up to 1.2 cm transversely of retrocecal appendix, 

appendiceal mucosal wall thickening with associated periappendiceal haziness 

consistent with acute appendicitis. Pt received Zosyn in ED, with subsequent 

rash but no respiratory compromise. Given Benadryl with resolution of symptoms. 

Pt was started on Cirpo and Flagyl. On 2/13, pt underwent lap appendectomy with 

Dr. Camejo, it was well tolerated. His blood glucose was well controlled in 

the hospital and did not need insulin. Microscopic blood was found on UA, with 

abd/pelv CT showing no calculi. Urine and blood cultures was negative. CT A/P 

shows multiple pulmonary nodules. Few paraortic and inguinal lymph nodes. CXR 

shows no evidence of acute pulmonary disease. History noted nodule in the RML in

the prior study is not clearly seen in the current study. Will refer for Chest 

CT on discharge and follow up with PMD or clinic


On discharge evaluation, pt is tolerating PO, walking without problems and has 

no complaints.





This is a summary of the hospital course. Please see EMR for full details.





Discharge Exam





- Additional Findings


Additional findings: 





- Constitutional


Appears: Non-toxic, No Acute Distress





- Head Exam


Head Exam: ATRAUMATIC, NORMAL INSPECTION





- Eye Exam


Eye Exam: EOMI, Normal appearance, PERRL





- ENT Exam


ENT Exam: Mucous Membranes Moist





- Neck Exam


Neck exam: Positive for: Normal Inspection





- Respiratory Exam


Respiratory Exam: Clear to Auscultation Bilateral, NORMAL BREATHING PATTERN





- Cardiovascular Exam


Cardiovascular Exam: RRR, +S1, +S2





- GI/Abdominal Exam


GI & Abdominal Exam: Normal Bowel Sounds, Soft, Nontender.  absent: Distended, 

Rebound, Rigid, Ecchymosis





- Extremities Exam


Extremities exam: Positive for: normal inspection, pedal pulses present.  

Negative for: pedal edema





- Back Exam


Back exam: NORMAL INSPECTION





- Neurological Exam


Neurological exam: Alert, CN II-XII Intact, Oriented x3





- Psychiatric Exam


Psychiatric exam: Normal Affect, Normal Mood





- Skin


Skin Exam: Normal Color, Warm. No rash. Laproscopic sites are nontender and 

covered with dressing.








Discharge Plan





- Discharge Medications


Prescriptions: 


RX: Docusate [Colace] 100 mg PO BID 7 Days #14 cap





- Follow Up Plan


Condition: FAIR


Disposition: HOME/ ROUTINE


Instructions:  Appendicitis, Adult (DC), Docusate, Appendectomy, Laparoscopic 

Surgery (DC)


Additional Instructions: 


Pt is medically stable for discharge home as per Dr. Nolan.





Pt should continue taking his previously prescribed medications. He will be 

provided with prescription for Colace 100 mg PO BID for 7 days as needed, 

Percocet 5/325 mg PO every 6 hours as needed for pain. Pt will be provided with 

a prescription for Chest CT.





Pt instructed to follow up with PMD or [facility] for management

 of chronic medical problems (2/19/19 at 9 am). He should also have a Chest CT 

done outpatient within 6 months to follow up on nodules noted on 

abdominal/pelvic CT. He should have a repeat UA for microscopic hematuria noted 

during inpatient UA. He should follow up with Dr. Camejo for post appendectomy

 management in 1-2 weeks.





If symptoms worsen, please go to the nearest emergency department for 

evaluation.





Instructions were explained to the pt, who understands and agrees with discharge

 plan.


Referrals: 


[facility] [Outside]


Hernan Camejo MD [Staff Provider] - 





<Rob Nolan - Last Filed: 02/14/19 16:26>





Provider





- Provider


Date of Admission: 


02/12/19 23:23





Attending physician: 


Isaak Knowles MD





Consults: 








02/12/19 23:29


General Surgery Consult Stat 


   Comment: 


   Consulting Provider: Hernan Camejo


   Consulting Physician: Hernan Camejo


   Reason for Consult: appendicitis














Hospital Course





- Lab Results


Lab Results: 


                                  Micro Results





02/12/19 20:36   Urine Random   Urine Culture - Final


                            No Growth (<1,000 CFU/ML)


02/13/19 00:30   Blood   Blood Culture - Preliminary


                            NO GROWTH AFTER 24 HOURS


02/13/19 00:36   Blood   Blood Culture - Preliminary


                            NO GROWTH AFTER 24 HOURS





                             Most Recent Lab Values











WBC  8.0 K/uL (4.8-10.8)   02/14/19  07:18    


 


RBC  4.80 Mil/uL (4.40-5.90)   02/14/19  07:18    


 


Hgb  14.3 g/dL (12.0-18.0)   02/14/19  07:18    


 


Hct  42.9 % (35.0-51.0)   02/14/19  07:18    


 


MCV  89.3 fL (80.0-94.0)   02/14/19  07:18    


 


MCH  29.8 pg (27.0-31.0)   02/14/19  07:18    


 


MCHC  33.4 g/dL (33.0-37.0)   02/14/19  07:18    


 


RDW  13.7 % (11.5-14.5)   02/14/19  07:18    


 


Plt Count  229 K/uL (130-400)   02/14/19  07:18    


 


MPV  8.1 fL (7.2-11.7)   02/14/19  07:18    


 


Neut % (Auto)  49.1 % (50.0-75.0)  L  02/14/19  07:18    


 


Lymph % (Auto)  36.6 % (20.0-40.0)   02/14/19  07:18    


 


Mono % (Auto)  11.6 % (0.0-10.0)  H  02/14/19  07:18    


 


Eos % (Auto)  2.3 % (0.0-4.0)   02/14/19  07:18    


 


Baso % (Auto)  0.4 % (0.0-2.0)   02/14/19  07:18    


 


Neut # (Auto)  3.9 K/uL (1.8-7.0)   02/14/19  07:18    


 


Lymph # (Auto)  2.9 K/uL (1.0-4.3)   02/14/19  07:18    


 


Mono # (Auto)  0.9 K/uL (0.0-0.8)  H  02/14/19  07:18    


 


Eos # (Auto)  0.2 K/uL (0.0-0.7)   02/14/19  07:18    


 


Baso # (Auto)  0.0 K/uL (0.0-0.2)   02/14/19  07:18    


 


PT  12.7 SECONDS (9.7-12.2)  H  02/13/19  00:46    


 


INR  1.2   02/13/19  00:46    


 


APTT  31 SECONDS (21-34)   02/13/19  00:46    


 


Sodium  136 mmol/L (132-148)   02/14/19  07:18    


 


Potassium  3.6 mmol/L (3.6-5.2)   02/14/19  07:18    


 


Chloride  99 mmol/L ()   02/14/19  07:18    


 


Carbon Dioxide  29 mmol/L (22-30)   02/14/19  07:18    


 


Anion Gap  12  (10-20)   02/14/19  07:18    


 


BUN  12 mg/dL (9-20)   02/14/19  07:18    


 


Creatinine  1.0 mg/dL (0.8-1.5)   02/14/19  07:18    


 


Est GFR ( Amer)  > 60   02/14/19  07:18    


 


Est GFR (Non-Af Amer)  > 60   02/14/19  07:18    


 


POC Glucose (mg/dL)  121 mg/dL ()  H  02/14/19  11:33    


 


Random Glucose  141 mg/dL ()  H  02/14/19  07:18    


 


Hemoglobin A1c  7.7 % (4.2-6.5)  H  02/13/19  06:38    


 


Calcium  8.3 mg/dl (8.6-10.4)  L  02/14/19  07:18    


 


Phosphorus  3.7 mg/dL (2.5-4.5)   02/14/19  07:18    


 


Magnesium  1.8 mg/dL (1.6-2.3)   02/14/19  07:18    


 


Total Bilirubin  0.9 mg/dL (0.2-1.3)   02/14/19  07:18    


 


AST  24 U/L (17-59)   02/14/19  07:18    


 


ALT  23 U/L (21-72)   02/14/19  07:18    


 


Alkaline Phosphatase  57 U/L ()   02/14/19  07:18    


 


Total Protein  7.4 g/dL (6.3-8.3)   02/14/19  07:18    


 


Albumin  4.1 g/dL (3.5-5.0)   02/14/19  07:18    


 


Globulin  3.3 gm/dL (2.2-3.9)   02/14/19  07:18    


 


Albumin/Globulin Ratio  1.3  (1.0-2.1)   02/14/19  07:18    


 


Urine Color  Yellow  (YELLOW)   02/12/19  20:36    


 


Urine Clarity  Clear  (Clear)   02/12/19  20:36    


 


Urine pH  6.0  (5.0-8.0)   02/12/19  20:36    


 


Ur Specific Gravity  1.010  (1.003-1.030)   02/12/19  20:36    


 


Urine Protein  Negative mg/dL (NEGATIVE)   02/12/19  20:36    


 


Urine Glucose (UA)  1+ mg/dL (Normal)  H  02/12/19  20:36    


 


Urine Ketones  Negative mg/dL (NEGATIVE)   02/12/19  20:36    


 


Urine Blood  2+  (NEGATIVE)  H  02/12/19  20:36    


 


Urine Nitrate  Negative  (NEGATIVE)   02/12/19  20:36    


 


Urine Bilirubin  Negative  (NEGATIVE)   02/12/19  20:36    


 


Urine Urobilinogen  Normal mg/dL (0.2-1.0)   02/12/19  20:36    


 


Ur Leukocyte Esterase  Neg Merrick/uL (Negative)   02/12/19  20:36    


 


Urine RBC (Auto)  15 /hpf (0-3)  H  02/12/19  20:36    














Attending/Attestation





- Attestation


I have personally seen and examined this patient.: Yes


I have fully participated in the care of the patient.: Yes


I have reviewed all pertinent clinical information, including history, physical 

exam and plan: Yes

## 2019-02-20 ENCOUNTER — HOSPITAL ENCOUNTER (OUTPATIENT)
Dept: HOSPITAL 31 - C.CTH | Age: 59
End: 2019-02-20
Payer: SELF-PAY

## 2019-02-20 DIAGNOSIS — R91.1: Primary | ICD-10-CM
